# Patient Record
Sex: FEMALE | Race: ASIAN | NOT HISPANIC OR LATINO
[De-identification: names, ages, dates, MRNs, and addresses within clinical notes are randomized per-mention and may not be internally consistent; named-entity substitution may affect disease eponyms.]

---

## 2019-05-10 ENCOUNTER — APPOINTMENT (OUTPATIENT)
Dept: UROGYNECOLOGY | Facility: CLINIC | Age: 34
End: 2019-05-10
Payer: COMMERCIAL

## 2019-05-10 VITALS
SYSTOLIC BLOOD PRESSURE: 135 MMHG | BODY MASS INDEX: 36.14 KG/M2 | OXYGEN SATURATION: 100 % | HEIGHT: 63 IN | TEMPERATURE: 98.5 F | HEART RATE: 83 BPM | WEIGHT: 204 LBS | DIASTOLIC BLOOD PRESSURE: 75 MMHG

## 2019-05-10 DIAGNOSIS — N89.8 OTHER SPECIFIED NONINFLAMMATORY DISORDERS OF VAGINA: ICD-10-CM

## 2019-05-10 DIAGNOSIS — Z84.1 FAMILY HISTORY OF DISORDERS OF KIDNEY AND URETER: ICD-10-CM

## 2019-05-10 DIAGNOSIS — Z82.49 FAMILY HISTORY OF ISCHEMIC HEART DISEASE AND OTHER DISEASES OF THE CIRCULATORY SYSTEM: ICD-10-CM

## 2019-05-10 DIAGNOSIS — Z83.3 FAMILY HISTORY OF DIABETES MELLITUS: ICD-10-CM

## 2019-05-10 DIAGNOSIS — N34.3 URETHRAL SYNDROME, UNSPECIFIED: ICD-10-CM

## 2019-05-10 DIAGNOSIS — Z87.42 PERSONAL HISTORY OF OTHER DISEASES OF THE FEMALE GENITAL TRACT: ICD-10-CM

## 2019-05-10 DIAGNOSIS — N94.819 VULVODYNIA, UNSPECIFIED: ICD-10-CM

## 2019-05-10 PROCEDURE — 99204 OFFICE O/P NEW MOD 45 MIN: CPT

## 2019-05-10 RX ORDER — DIAZEPAM 100 %
POWDER (GRAM) MISCELLANEOUS
Qty: 60 | Refills: 0 | Status: ACTIVE | COMMUNITY
Start: 2019-05-10 | End: 1900-01-01

## 2019-05-10 NOTE — HISTORY OF PRESENT ILLNESS
[FreeTextEntry1] : History of 1 year of recurrent vaginal yeast and BV infections, worse in past few months, and dyspareunia - on insertion and for first minute or so. No bleeding with intercourse. Same sexual partner - , for many years. No new medications, no med problems, no DV/abuse, no trauma. Had pleasurable intercourse in past. C/S in  and  and was fine since, never in second stage of labor, no vaginal tears or pathology. Normal bowel movements without constipation. Denies urinary complaints such as leakage, UTIs, dysuria, gross hematuria, or incomplete emptying. No bulge or pressure from vagina. Gyn has checked cultures, pap smear up to date and normal, has been treated for yeast and recurrent yeast as well as BV with meds such as metrogel, diflucan, boric acid.\par \par History of  x 2.

## 2019-05-10 NOTE — OB HISTORY
[ ___] : [unfilled]  section delivery(s) [Abstaining d/t Present Problem] : abstaining due to present problem [Sexually Active] : is not sexually active [FreeTextEntry1] : largest baby 7 lbs

## 2019-05-10 NOTE — PHYSICAL EXAM
[No Acute Distress] : in no acute distress [Oriented x3] : oriented to person, place, and time [No Edema] : ~T edema was not present [Symmetrical] : the neck was ~L symmetrical [Warm and Dry] : was warm and dry to touch [Labia Majora] : were normal [Normal Gait] : gait was normal [Bartholin's Gland] : both Bartholin's glands were normal  [Normal Appearance] : general appearance was normal [No Bleeding] : there was no active vaginal bleeding [Aa ____] : Aa [unfilled] [2] : 2 [Ba ____] : Ba [unfilled] [C ____] : C [unfilled] [TVL ____] : TVL  [unfilled] [PB ____] : PB [unfilled] [GH ____] : GH [unfilled] [Bp ____] : Bp [unfilled] [Ap ____] : Ap [unfilled] [D ____] : D [unfilled] [] : 0 [Normal] : normal [Soft] :  the cervix was soft [Exam Deferred] : was deferred [Tenderness] : ~T no ~M abdominal tenderness observed [Distended] : not distended [Inguinal LAD] : no adenopathy was noted in the inguinal lymph nodes [de-identified] : posterior fourchette tender to light touch with soft q-tip - reports skin pain/burning type of sensation, similar to dyspareunia on insertion - no lesion or mass, no discoloration here [FreeTextEntry4] : no cysts, no lesion, no mass, no discharge; prominent bilateral levators, thick, tender [de-identified] : deferred PVR [de-identified] : no appreciable masses

## 2019-05-10 NOTE — ASSESSMENT
[FreeTextEntry1] : Sylvia is a pleasant 34 yo P2, PSHx includes  x 2, presents with recent onset dyspareunia. On external vaginal exam, q-tip testing was done, and she had tenderness to soft touch to the posterior fourchette. There was no discharge or bleeding. On pelvic exam, there were no appreciable masses or lesions. Pelvic floor muscle contraction strength was present but weak. There was bilateral diffuse levator or pelvic floor musculature thickened banding, tightness, and tenderness. POPQ exam did not demonstrate clinically significant pelvic organ prolapse. There were no vaginal cysts or lacerations. 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 Diagnosis of exclusion discussed, as cultures and other pathology have been ruled out already. Topical vs oral meds discussed, as well as other treatment options including PT and TPIs, PNB. I recommend PT. She will also try amitriptyline 10mg, risks and SEs discussed such as drowsiness. Titrate up if no SEs and if needed additional pain relief. Must be weaned. No more than 50mg qhs discussed. She agreed.\par \par I discussed my exam findings and PFD. Relaxation, warm soaks, avoiding constipation discussed. PT discussed. Vaginal valium suppositories discussed which she would like to try. TPIs for refractory cases discussed. She will try valium suppositories, R/B/A discussed, sent to compounding pharmacy for 5mg. She is reluctant to go to PT based on her schedule regularly, however I feel that pelvic floor PT is the best first management option for her. She will try to go to at least 1 session and learn what she can do at home in conjunction with the medical therapy. \par \par RTO in 3 months or prn. Continue care with other providers such as PMD, gyn. All questions answered.\par \par I could not find on my exam, an explanation for recurrent yeast infections. However, the irritation type of feeling she is describing could also be vulvodynia.\par \par Plan:\par [] pelvic floor PT session - eval/recs?\par [] vulvodynia - amitriptyline 10mg qhs\par [] PFD - PT, relaxation, vaginal valium suppositories\par [] check PVR next visit

## 2019-05-12 LAB — BACTERIA UR CULT: NORMAL

## 2019-05-20 RX ORDER — AMITRIPTYLINE HYDROCHLORIDE 10 MG/1
10 TABLET, FILM COATED ORAL
Qty: 30 | Refills: 2 | Status: ACTIVE | COMMUNITY
Start: 2019-05-20 | End: 1900-01-01

## 2019-05-20 RX ORDER — AMITRIPTYLINE HYDROCHLORIDE 10 MG/1
10 TABLET, FILM COATED ORAL
Qty: 30 | Refills: 3 | Status: COMPLETED | COMMUNITY
Start: 2019-05-10 | End: 2019-05-20

## 2019-05-24 ENCOUNTER — APPOINTMENT (OUTPATIENT)
Dept: UROGYNECOLOGY | Facility: CLINIC | Age: 34
End: 2019-05-24

## 2021-03-28 ENCOUNTER — TRANSCRIPTION ENCOUNTER (OUTPATIENT)
Age: 36
End: 2021-03-28

## 2021-04-07 ENCOUNTER — TRANSCRIPTION ENCOUNTER (OUTPATIENT)
Age: 36
End: 2021-04-07

## 2021-07-06 ENCOUNTER — TRANSCRIPTION ENCOUNTER (OUTPATIENT)
Age: 36
End: 2021-07-06

## 2021-10-06 ENCOUNTER — TRANSCRIPTION ENCOUNTER (OUTPATIENT)
Age: 36
End: 2021-10-06

## 2021-12-14 ENCOUNTER — APPOINTMENT (OUTPATIENT)
Dept: ORTHOPEDIC SURGERY | Facility: CLINIC | Age: 36
End: 2021-12-14
Payer: COMMERCIAL

## 2021-12-14 VITALS — WEIGHT: 190 LBS | BODY MASS INDEX: 33.66 KG/M2 | HEIGHT: 63 IN

## 2021-12-14 PROCEDURE — 22310 CLOSED TX VERT FX W/O MANJ: CPT

## 2021-12-14 PROCEDURE — 99204 OFFICE O/P NEW MOD 45 MIN: CPT | Mod: 57

## 2021-12-14 RX ORDER — TRAMADOL HYDROCHLORIDE 50 MG/1
50 TABLET, COATED ORAL 3 TIMES DAILY
Qty: 90 | Refills: 0 | Status: ACTIVE | COMMUNITY
Start: 2021-12-14 | End: 1900-01-01

## 2022-06-01 ENCOUNTER — APPOINTMENT (OUTPATIENT)
Dept: ORTHOPEDIC SURGERY | Facility: CLINIC | Age: 37
End: 2022-06-01
Payer: COMMERCIAL

## 2022-06-01 VITALS — WEIGHT: 200 LBS | BODY MASS INDEX: 36.8 KG/M2 | HEIGHT: 62 IN

## 2022-06-01 DIAGNOSIS — S32.009A UNSPECIFIED FRACTURE OF UNSPECIFIED LUMBAR VERTEBRA, INITIAL ENCOUNTER FOR CLOSED FRACTURE: ICD-10-CM

## 2022-06-01 DIAGNOSIS — M54.50 LOW BACK PAIN, UNSPECIFIED: ICD-10-CM

## 2022-06-01 PROCEDURE — 99214 OFFICE O/P EST MOD 30 MIN: CPT

## 2023-01-16 ENCOUNTER — NON-APPOINTMENT (OUTPATIENT)
Age: 38
End: 2023-01-16

## 2024-07-04 ENCOUNTER — NON-APPOINTMENT (OUTPATIENT)
Age: 39
End: 2024-07-04

## 2025-06-09 ENCOUNTER — APPOINTMENT (OUTPATIENT)
Dept: SURGERY | Facility: CLINIC | Age: 40
End: 2025-06-09
Payer: MEDICAID

## 2025-06-09 VITALS
OXYGEN SATURATION: 98 % | DIASTOLIC BLOOD PRESSURE: 82 MMHG | HEART RATE: 78 BPM | BODY MASS INDEX: 36.32 KG/M2 | WEIGHT: 205 LBS | HEIGHT: 63 IN | SYSTOLIC BLOOD PRESSURE: 117 MMHG

## 2025-06-09 PROCEDURE — 99203 OFFICE O/P NEW LOW 30 MIN: CPT

## 2025-06-26 ENCOUNTER — NON-APPOINTMENT (OUTPATIENT)
Age: 40
End: 2025-06-26

## 2025-07-17 ENCOUNTER — APPOINTMENT (OUTPATIENT)
Dept: SURGERY | Facility: CLINIC | Age: 40
End: 2025-07-17
Payer: MEDICAID

## 2025-07-17 PROCEDURE — 99212 OFFICE O/P EST SF 10 MIN: CPT
